# Patient Record
Sex: MALE | Race: WHITE | NOT HISPANIC OR LATINO | Employment: FULL TIME | URBAN - METROPOLITAN AREA
[De-identification: names, ages, dates, MRNs, and addresses within clinical notes are randomized per-mention and may not be internally consistent; named-entity substitution may affect disease eponyms.]

---
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
## 2018-01-02 PROBLEM — M25.572 LEFT ANKLE PAIN: Status: ACTIVE | Noted: 2017-12-20

## 2018-02-14 PROBLEM — M25.572 LEFT ANKLE PAIN: Status: RESOLVED | Noted: 2017-12-20 | Resolved: 2018-02-14

## 2020-11-27 ENCOUNTER — NURSE TRIAGE (OUTPATIENT)
Dept: HEALTH INFORMATION MANAGEMENT | Facility: OTHER | Age: 20
End: 2020-11-27

## 2020-11-27 NOTE — TELEPHONE ENCOUNTER
"Sexual partner + for gonorrheae.  Abdominal pain, dry sore throat & bumps on his tongue.      Reason for Disposition  • Male with ANY of the following: * Burning (pain) with urination * Pus (white, yellow) or bloody discharge from end of penis* Testicle pain or swelling    Additional Information  • Negative: Rash or sores on penis or scrotum  • Negative: Rash or sores on female genital area (vulvar area)  • Negative: Forced to have sex (sexual assault or rape) in the past 3 days  • Negative: Sexual intercourse (in the past 72 hours) with someone who was diagnosed with HIV  • Negative: Female and ANY of the following: * Fever and burning (pain) with urination * Constant lower abdominal pain lasting more than 2 hours * Unable to urinate for more than 4 hours, and bladder feels very full  • Negative: Male and ANY of the following: * Fever and burning (pain) with urination * Fever and testicle pain or swelling * Unable to urinate for more than 4 hours, and bladder feels very full    Answer Assessment - Initial Assessment Questions  1. TYPE of EXPOSURE: \"How were you exposed to the STD?\" (e.g., oral, vaginal, or rectal intercourse)      vaginal  2. DATE of EXPOSURE: \"When did the exposure occur?\" (e.g., days)      See notes  3. SYMPTOMS: \"Do you have any symptoms?\" (e.g., pain with urination)      Silvestre when he pees & frequent urination    Protocols used: STD EXPOSURE AND PREVENTION-A-OH      "